# Patient Record
Sex: FEMALE | Race: WHITE | NOT HISPANIC OR LATINO | Employment: PART TIME | URBAN - METROPOLITAN AREA
[De-identification: names, ages, dates, MRNs, and addresses within clinical notes are randomized per-mention and may not be internally consistent; named-entity substitution may affect disease eponyms.]

---

## 2024-09-27 ENCOUNTER — TELEMEDICINE (OUTPATIENT)
Dept: PRIMARY CARE | Facility: CLINIC | Age: 59
End: 2024-09-27

## 2024-09-27 VITALS — WEIGHT: 187 LBS | HEIGHT: 67 IN | BODY MASS INDEX: 29.35 KG/M2

## 2024-09-27 DIAGNOSIS — A49.9 BACTERIAL INFECTION: ICD-10-CM

## 2024-09-27 DIAGNOSIS — K52.9 GASTROENTERITIS: ICD-10-CM

## 2024-09-27 DIAGNOSIS — K57.92 DIVERTICULITIS: Primary | ICD-10-CM

## 2024-09-27 PROCEDURE — 99203 OFFICE O/P NEW LOW 30 MIN: CPT | Performed by: EMERGENCY MEDICINE

## 2024-09-27 PROCEDURE — 1036F TOBACCO NON-USER: CPT | Performed by: EMERGENCY MEDICINE

## 2024-09-27 PROCEDURE — 3008F BODY MASS INDEX DOCD: CPT | Performed by: EMERGENCY MEDICINE

## 2024-09-27 RX ORDER — CIPROFLOXACIN 500 MG/1
500 TABLET ORAL 2 TIMES DAILY
Qty: 14 TABLET | Refills: 0 | Status: SHIPPED | OUTPATIENT
Start: 2024-09-27 | End: 2024-10-04

## 2024-09-27 RX ORDER — METRONIDAZOLE 500 MG/1
500 TABLET ORAL 2 TIMES DAILY
Qty: 14 TABLET | Refills: 0 | Status: SHIPPED | OUTPATIENT
Start: 2024-09-27 | End: 2024-10-04

## 2024-09-27 RX ORDER — DICYCLOMINE HYDROCHLORIDE 20 MG/1
TABLET ORAL 4 TIMES DAILY
COMMUNITY

## 2024-09-27 RX ORDER — METHOCARBAMOL 750 MG/1
750 TABLET, FILM COATED ORAL 2 TIMES DAILY
COMMUNITY
Start: 2024-09-27

## 2024-09-27 RX ORDER — SIMVASTATIN 10 MG/1
10 TABLET, FILM COATED ORAL NIGHTLY
COMMUNITY

## 2024-09-27 RX ORDER — NAPROXEN 500 MG/1
500 TABLET ORAL
COMMUNITY
Start: 2024-09-27

## 2024-09-27 ASSESSMENT — PATIENT HEALTH QUESTIONNAIRE - PHQ9
SUM OF ALL RESPONSES TO PHQ9 QUESTIONS 1 AND 2: 0
1. LITTLE INTEREST OR PLEASURE IN DOING THINGS: NOT AT ALL
2. FEELING DOWN, DEPRESSED OR HOPELESS: NOT AT ALL

## 2024-09-27 NOTE — PROGRESS NOTES
"Subjective   Patient ID: Selam Gaffney is a 59 y.o. female who presents for ER Follow-up.    HPI   Patient presents for virtual ER follow up    This visit was completed virtually due to the restrictions of the COVID-19 pandemic. All issues as below were discussed and addressed but no physical exam was performed. If it was felt that the patient should be evaluated in clinic or ER, then they were directed there. The patient verbally consented to visit.    Patient visited ER for lower back and left abdomen pain. She was given methocarbamol and naproxen.     History of hernia, diverticulitis    Review of Systems  Comprehensive review of symptoms was not positive for any symptoms other than HPI.     Objective   Ht 1.702 m (5' 7\")   Wt 84.8 kg (187 lb)   BMI 29.29 kg/m²     Physical Exam  Patient was not physically examined as this visit was completed virtually.     Assessment/Plan     Patient presents for virtual ER follow up visit    Diverticulitis - Patient complains of lower back and left lower abdominal pain, likely diverticulitis. We will prescribe cipro and flagyl and recommend OTC miralax.    Follow up in a few months to officially establish care  "

## 2025-04-25 ENCOUNTER — APPOINTMENT (OUTPATIENT)
Dept: URGENT CARE | Age: 60
End: 2025-04-25
Payer: COMMERCIAL